# Patient Record
Sex: MALE | Race: BLACK OR AFRICAN AMERICAN
[De-identification: names, ages, dates, MRNs, and addresses within clinical notes are randomized per-mention and may not be internally consistent; named-entity substitution may affect disease eponyms.]

---

## 2019-04-15 ENCOUNTER — HOSPITAL ENCOUNTER (EMERGENCY)
Dept: HOSPITAL 84 - D.ER | Age: 69
Discharge: HOME | End: 2019-04-15
Payer: MEDICARE

## 2019-04-15 VITALS
WEIGHT: 160.34 LBS | HEIGHT: 71 IN | BODY MASS INDEX: 22.45 KG/M2 | WEIGHT: 160.34 LBS | HEIGHT: 71 IN | BODY MASS INDEX: 22.45 KG/M2

## 2019-04-15 VITALS — SYSTOLIC BLOOD PRESSURE: 132 MMHG | DIASTOLIC BLOOD PRESSURE: 86 MMHG

## 2019-04-15 DIAGNOSIS — E86.0: ICD-10-CM

## 2019-04-15 DIAGNOSIS — N28.9: Primary | ICD-10-CM

## 2019-04-15 LAB
ALBUMIN SERPL-MCNC: 3.6 G/DL (ref 3.4–5)
ALP SERPL-CCNC: 55 U/L (ref 46–116)
ALT SERPL-CCNC: 17 U/L (ref 10–68)
AMYLASE SERPL-CCNC: 63 U/L (ref 25–115)
ANION GAP SERPL CALC-SCNC: 14 MMOL/L (ref 8–16)
APTT BLD: 28.3 SECONDS (ref 22.8–39.4)
BASOPHILS NFR BLD AUTO: 0.2 % (ref 0–2)
BILIRUB SERPL-MCNC: 0.32 MG/DL (ref 0.2–1.3)
BUN SERPL-MCNC: 37 MG/DL (ref 7–18)
CALCIUM SERPL-MCNC: 9.2 MG/DL (ref 8.5–10.1)
CHLORIDE SERPL-SCNC: 104 MMOL/L (ref 98–107)
CK MB SERPL-MCNC: 2.5 U/L (ref 0–3.6)
CK SERPL-CCNC: 181 UL (ref 21–232)
CO2 SERPL-SCNC: 26.6 MMOL/L (ref 21–32)
CREAT SERPL-MCNC: 1.7 MG/DL (ref 0.6–1.3)
EOSINOPHIL NFR BLD: 0.4 % (ref 0–7)
ERYTHROCYTE [DISTWIDTH] IN BLOOD BY AUTOMATED COUNT: 12.6 % (ref 11.5–14.5)
GLOBULIN SER-MCNC: 4.4 G/L
GLUCOSE SERPL-MCNC: 109 MG/DL (ref 74–106)
HCT VFR BLD CALC: 39.1 % (ref 42–54)
HGB BLD-MCNC: 13.7 G/DL (ref 13.5–17.5)
IMM GRANULOCYTES NFR BLD: 0 % (ref 0–5)
INR PPP: 1.09 (ref 0.85–1.17)
LIPASE SERPL-CCNC: 186 U/L (ref 73–393)
LYMPHOCYTES NFR BLD AUTO: 14.9 % (ref 15–50)
MCH RBC QN AUTO: 33.9 PG (ref 26–34)
MCHC RBC AUTO-ENTMCNC: 35 G/DL (ref 31–37)
MCV RBC: 96.8 FL (ref 80–100)
MONOCYTES NFR BLD: 7.8 % (ref 2–11)
NEUTROPHILS NFR BLD AUTO: 76.7 % (ref 40–80)
OSMOLALITY SERPL CALC.SUM OF ELEC: 290 MOSM/KG (ref 275–300)
PLATELET # BLD: 175 10X3/UL (ref 130–400)
PMV BLD AUTO: 10.7 FL (ref 7.4–10.4)
POTASSIUM SERPL-SCNC: 3.6 MMOL/L (ref 3.5–5.1)
PROT SERPL-MCNC: 8 G/DL (ref 6.4–8.2)
PROTHROMBIN TIME: 13.6 SECONDS (ref 11.6–15)
RBC # BLD AUTO: 4.04 10X6/UL (ref 4.2–6.1)
SODIUM SERPL-SCNC: 141 MMOL/L (ref 136–145)
TROPONIN I SERPL-MCNC: < 0.017 NG/ML (ref 0–0.06)
WBC # BLD AUTO: 4.6 10X3/UL (ref 4.8–10.8)

## 2021-07-02 ENCOUNTER — HOSPITAL ENCOUNTER (INPATIENT)
Dept: HOSPITAL 84 - D.ER | Age: 71
LOS: 1 days | Discharge: HOME | DRG: 292 | End: 2021-07-03
Attending: FAMILY MEDICINE | Admitting: FAMILY MEDICINE
Payer: MEDICARE

## 2021-07-02 VITALS — WEIGHT: 201.42 LBS | BODY MASS INDEX: 28.2 KG/M2 | HEIGHT: 71 IN

## 2021-07-02 VITALS — SYSTOLIC BLOOD PRESSURE: 123 MMHG | DIASTOLIC BLOOD PRESSURE: 57 MMHG

## 2021-07-02 VITALS — DIASTOLIC BLOOD PRESSURE: 78 MMHG | SYSTOLIC BLOOD PRESSURE: 127 MMHG

## 2021-07-02 VITALS — DIASTOLIC BLOOD PRESSURE: 77 MMHG | SYSTOLIC BLOOD PRESSURE: 119 MMHG

## 2021-07-02 VITALS — DIASTOLIC BLOOD PRESSURE: 61 MMHG | SYSTOLIC BLOOD PRESSURE: 111 MMHG

## 2021-07-02 DIAGNOSIS — J44.1: ICD-10-CM

## 2021-07-02 DIAGNOSIS — E87.5: ICD-10-CM

## 2021-07-02 DIAGNOSIS — D64.9: ICD-10-CM

## 2021-07-02 DIAGNOSIS — I25.10: ICD-10-CM

## 2021-07-02 DIAGNOSIS — F17.200: ICD-10-CM

## 2021-07-02 DIAGNOSIS — E87.2: ICD-10-CM

## 2021-07-02 DIAGNOSIS — I13.0: Primary | ICD-10-CM

## 2021-07-02 DIAGNOSIS — N17.9: ICD-10-CM

## 2021-07-02 DIAGNOSIS — R07.9: ICD-10-CM

## 2021-07-02 DIAGNOSIS — N18.30: ICD-10-CM

## 2021-07-02 DIAGNOSIS — I50.9: ICD-10-CM

## 2021-07-02 LAB
ALBUMIN SERPL-MCNC: 3.9 G/DL (ref 3.4–5)
ALP SERPL-CCNC: 60 U/L (ref 30–120)
ALT SERPL-CCNC: 25 U/L (ref 10–68)
AMPHETAMINES UR QL SCN: NEGATIVE QUAL
ANION GAP SERPL CALC-SCNC: 13.7 MMOL/L (ref 8–16)
ANION GAP SERPL CALC-SCNC: 17.7 MMOL/L (ref 8–16)
ANION GAP SERPL CALC-SCNC: 19.7 MMOL/L (ref 8–16)
ANION GAP SERPL CALC-SCNC: 23.3 MMOL/L (ref 8–16)
BARBITURATES UR QL SCN: NEGATIVE QUAL
BASOPHILS NFR BLD AUTO: 1.1 % (ref 0–2)
BENZODIAZ UR QL SCN: NEGATIVE QUAL
BILIRUB SERPL-MCNC: 0.29 MG/DL (ref 0.2–1.3)
BILIRUB SERPL-MCNC: NEGATIVE MG/DL
BUN SERPL-MCNC: 63 MG/DL (ref 7–18)
BUN SERPL-MCNC: 69 MG/DL (ref 7–18)
BUN SERPL-MCNC: 81 MG/DL (ref 7–18)
BUN SERPL-MCNC: 84 MG/DL (ref 7–18)
BZE UR QL SCN: NEGATIVE QUAL
CALCIUM SERPL-MCNC: 7.8 MG/DL (ref 8.5–10.1)
CALCIUM SERPL-MCNC: 8.1 MG/DL (ref 8.5–10.1)
CALCIUM SERPL-MCNC: 8.4 MG/DL (ref 8.5–10.1)
CALCIUM SERPL-MCNC: 8.6 MG/DL (ref 8.5–10.1)
CANNABINOIDS UR QL SCN: NEGATIVE QUAL
CHLORIDE SERPL-SCNC: 107 MMOL/L (ref 98–107)
CHLORIDE SERPL-SCNC: 107 MMOL/L (ref 98–107)
CHLORIDE SERPL-SCNC: 109 MMOL/L (ref 98–107)
CHLORIDE SERPL-SCNC: 111 MMOL/L (ref 98–107)
CK MB SERPL-MCNC: 11.1 U/L (ref 0–3.6)
CK MB SERPL-MCNC: 13.4 U/L (ref 0–3.6)
CK MB SERPL-MCNC: 6.4 U/L (ref 0–3.6)
CK SERPL-CCNC: 278 UL (ref 21–232)
CK SERPL-CCNC: 290 UL (ref 21–232)
CK SERPL-CCNC: 327 UL (ref 21–232)
CO2 SERPL-SCNC: 11.4 MMOL/L (ref 21–32)
CO2 SERPL-SCNC: 12 MMOL/L (ref 21–32)
CO2 SERPL-SCNC: 18.2 MMOL/L (ref 21–32)
CO2 SERPL-SCNC: 20.1 MMOL/L (ref 21–32)
CREAT SERPL-MCNC: 2.1 MG/DL (ref 0.6–1.3)
CREAT SERPL-MCNC: 2.3 MG/DL (ref 0.6–1.3)
CREAT SERPL-MCNC: 2.9 MG/DL (ref 0.6–1.3)
CREAT SERPL-MCNC: 3.2 MG/DL (ref 0.6–1.3)
EOSINOPHIL NFR BLD: 1 % (ref 0–7)
ERYTHROCYTE [DISTWIDTH] IN BLOOD BY AUTOMATED COUNT: 13.8 % (ref 11.5–14.5)
ETHANOL SERPL-MCNC: 0 MG/DL (ref 0–10)
GLOBULIN SER-MCNC: 4.1 G/L
GLUCOSE SERPL-MCNC: 199 MG/DL (ref 74–106)
GLUCOSE SERPL-MCNC: 65 MG/DL (ref 74–106)
GLUCOSE SERPL-MCNC: 78 MG/DL (ref 74–106)
GLUCOSE SERPL-MCNC: 88 MG/DL (ref 74–106)
HCT VFR BLD CALC: 35.1 % (ref 42–54)
HGB BLD-MCNC: 11.8 G/DL (ref 13.5–17.5)
KETONES UR STRIP-MCNC: NEGATIVE MG/DL
LYMPHOCYTES NFR BLD AUTO: 18.1 % (ref 15–50)
MCH RBC QN AUTO: 33.3 PG (ref 26–34)
MCHC RBC AUTO-ENTMCNC: 33.5 G/DL (ref 31–37)
MCV RBC: 99.4 FL (ref 80–100)
MONOCYTES NFR BLD: 11.4 % (ref 2–11)
NEUTROPHILS # BLD: 3.5 10X3/UL (ref 1.78–5.38)
NEUTROPHILS NFR BLD AUTO: 68.4 % (ref 40–80)
NITRITE UR-MCNC: NEGATIVE MG/ML
NT-PROBNP SERPL-MCNC: 108 PG/ML (ref 0–125)
OPIATES UR QL SCN: NEGATIVE QUAL
OSMOLALITY SERPL CALC.SUM OF ELEC: 288 MOSM/KG (ref 275–300)
OSMOLALITY SERPL CALC.SUM OF ELEC: 295 MOSM/KG (ref 275–300)
OSMOLALITY SERPL CALC.SUM OF ELEC: 296 MOSM/KG (ref 275–300)
OSMOLALITY SERPL CALC.SUM OF ELEC: 300 MOSM/KG (ref 275–300)
PCP UR QL SCN: NEGATIVE QUAL
PH UR STRIP: 5 [PH] (ref 5–8)
PLATELET # BLD: 207 10X3/UL (ref 130–400)
PMV BLD AUTO: 8.2 FL (ref 7.4–10.4)
POTASSIUM SERPL-SCNC: 4.8 MMOL/L (ref 3.5–5.1)
POTASSIUM SERPL-SCNC: 5.1 MMOL/L (ref 3.5–5.1)
POTASSIUM SERPL-SCNC: 6.3 MMOL/L (ref 3.5–5.1)
POTASSIUM SERPL-SCNC: 6.9 MMOL/L (ref 3.5–5.1)
PROT SERPL-MCNC: 8 G/DL (ref 6.4–8.2)
RBC # BLD AUTO: 3.53 10X6/UL (ref 4.2–6.1)
SARS-COV+SARS-COV-2 AG RESP QL IA.RAPID: (no result)
SODIUM SERPL-SCNC: 135 MMOL/L (ref 136–145)
SODIUM SERPL-SCNC: 136 MMOL/L (ref 136–145)
SODIUM SERPL-SCNC: 136 MMOL/L (ref 136–145)
SODIUM SERPL-SCNC: 140 MMOL/L (ref 136–145)
SQUAMOUS #/AREA URNS HPF: <1 HPF (ref 0–4)
TROPONIN I SERPL-MCNC: 0.02 NG/ML (ref 0–0.06)
TROPONIN I SERPL-MCNC: 0.13 NG/ML (ref 0–0.06)
TROPONIN I SERPL-MCNC: 1.14 NG/ML (ref 0–0.06)
UROBILINOGEN UR-MCNC: NORMAL MG/DL (ref ?–2)
WBC # BLD AUTO: 5 10X3/UL (ref 4.8–10.8)
WBC #/AREA URNS HPF: 2 HPF (ref 0–1)

## 2021-07-02 NOTE — NUR
CRITICAL LAB VALUE CALLED TO ED. POTASSIUM OF 6.9. EDP KIRBY NOTIFIED. FURTHER
ORDERS TO BE ENTERED INTO CHART.

## 2021-07-02 NOTE — NUR
RECEIVED TO ROOM VIA STRETCHER FROM THE ED. IV INFUSING TO LEFT AC AND SALINE
LOCK TO RIGHT AC. YELLOWW GOWN PLACED ON PATIENT ALONG WITH YELLOW ARM BAND
AND NON SKID SOCKS. DENEIS NEEDS AT THIS TIME. WILL ADMIT.

## 2021-07-03 VITALS — DIASTOLIC BLOOD PRESSURE: 74 MMHG | SYSTOLIC BLOOD PRESSURE: 104 MMHG

## 2021-07-03 VITALS — DIASTOLIC BLOOD PRESSURE: 66 MMHG | SYSTOLIC BLOOD PRESSURE: 108 MMHG

## 2021-07-03 VITALS — SYSTOLIC BLOOD PRESSURE: 110 MMHG | DIASTOLIC BLOOD PRESSURE: 76 MMHG

## 2021-07-03 LAB
ALBUMIN SERPL-MCNC: 3 G/DL (ref 3.4–5)
ALP SERPL-CCNC: 52 U/L (ref 30–120)
ALT SERPL-CCNC: 20 U/L (ref 10–68)
ANION GAP SERPL CALC-SCNC: 15.3 MMOL/L (ref 8–16)
BASOPHILS NFR BLD AUTO: 0.4 % (ref 0–2)
BILIRUB SERPL-MCNC: 0.45 MG/DL (ref 0.2–1.3)
BUN SERPL-MCNC: 54 MG/DL (ref 7–18)
CALCIUM SERPL-MCNC: 8.1 MG/DL (ref 8.5–10.1)
CHLORIDE SERPL-SCNC: 109 MMOL/L (ref 98–107)
CO2 SERPL-SCNC: 20.2 MMOL/L (ref 21–32)
CREAT SERPL-MCNC: 1.8 MG/DL (ref 0.6–1.3)
EOSINOPHIL NFR BLD: 0.2 % (ref 0–7)
ERYTHROCYTE [DISTWIDTH] IN BLOOD BY AUTOMATED COUNT: 13.2 % (ref 11.5–14.5)
GLOBULIN SER-MCNC: 3.2 G/L
GLUCOSE SERPL-MCNC: 85 MG/DL (ref 74–106)
HCT VFR BLD CALC: 32.8 % (ref 42–54)
HGB BLD-MCNC: 10.9 G/DL (ref 13.5–17.5)
LYMPHOCYTES NFR BLD AUTO: 15.8 % (ref 15–50)
MAGNESIUM SERPL-MCNC: 2.3 MG/DL (ref 1.8–2.4)
MCH RBC QN AUTO: 33 PG (ref 26–34)
MCHC RBC AUTO-ENTMCNC: 33.1 G/DL (ref 31–37)
MCV RBC: 99.7 FL (ref 80–100)
MONOCYTES NFR BLD: 14.3 % (ref 2–11)
NEUTROPHILS # BLD: 3.9 10X3/UL (ref 1.78–5.38)
NEUTROPHILS NFR BLD AUTO: 69.3 % (ref 40–80)
OSMOLALITY SERPL CALC.SUM OF ELEC: 292 MOSM/KG (ref 275–300)
PHOSPHATE SERPL-MCNC: 3.9 MG/DL (ref 2.5–4.9)
PLATELET # BLD: 179 10X3/UL (ref 130–400)
PMV BLD AUTO: 9 FL (ref 7.4–10.4)
POTASSIUM SERPL-SCNC: 4.5 MMOL/L (ref 3.5–5.1)
PROT SERPL-MCNC: 6.2 G/DL (ref 6.4–8.2)
RBC # BLD AUTO: 3.28 10X6/UL (ref 4.2–6.1)
SODIUM SERPL-SCNC: 140 MMOL/L (ref 136–145)
WBC # BLD AUTO: 5.6 10X3/UL (ref 4.8–10.8)

## 2021-07-03 NOTE — NUR
PROVIDED VERBAL AND WRITTEN DISCHARGE TEACHING. PT STATED THAT HIS RIDE WILL
NOT BE HERE UNTIL AFTER 1630. ALL NEEDS MET, CALL LIGHT IN REACH.

## 2021-07-03 NOTE — MORECARE
CASE MANAGEMENT DISCHARGE SUMMARY
 
 
PATIENT: TONIA ROLLINS                        UNIT: [id number]
ACCOUNT#: [id number]                       ADM DATE: 21
AGE: 70     : [birth year]  SEX: M            ROOM/BED: D.2113    
AUTHOR: OLLIE GUERIN                             PHYSICIAN:                               
 
REFERRING PHYSICIAN: BARBRA CLIFTON MD          
DATE OF SERVICE: 21
Case Management Discharge Planning Summary
 
 
COMMENTS 
ENTERED DATE:  7/3/21  15:27 CT
COMMENT TYPE:  Discharge Planning
REVIEWER: Cruz Nguyen
CM met with patient to complete DC plan and to evaluate needs.  Patient is 
dependent for ADLs and lives alone.  Patient stated that he has a caregiver, 
Marcela Henry, who will give him a ride home.  Patient stated that he will not 
be able to go home today until after 4pm.  Patient stated that his home is 
safe and has electricity and running water.  Patient stated that the home 
has 1 step to enter and he is able to manage the step without difficulty.  
Patient stated that he has no problems paying for medications and he fills 
his medications at [facility].  Patient stated that his primary 
care physician is Dr. Gomez.  At discharge, the patient plans to return home 
and feels this is a safe discharge.  CM discussed availability of home 
health, rehab services, and medical equipment.  Patient declined HHS, SNF, 
IPR, and DME.  Patient stated that he would like information on in-home 
caregivers.  CM gave patient a pamphlet on [employer].  Patient 
stated that he has a cane and walker.  Patient voiced no other needs at this 
time and is satisfied with DC plan.  Transportation provider at discharge 
will be with Marcela.  DC IMM delivered, explained, signed by the patient, and 
placed in chart.  Signed form also left with the patient.  CM will continue 
to follow and will assist as needed with dc plans/needs.
 
 
DCP REVIEW SUMMARY
ANTICIPATED D/C DATE: 2021
EXPECTED LOS : 1
CASE STATUS:  DCP Initiated
INITIAL REVIEW:  2021
INITIAL REVIEWER:   Cruz Nguyen
FINAL DISCHARGE DISPOSITION:  : 
FINAL REVIEWER:  
FINAL REVIEW DATE: 
 
  
 
DCP Focus Questions & Answers
 
 
DCP Evaluation
QUESTION: ANSWER
Patient and/or caregiver agree upon recommended discharge plan? : Yes
Patient's current cognitive status: : *Oriented to person, place, situation, 
time and present
Patient's ability to cope with chronic illness : d. No chronic illness
Patient gives permission to discuss discharge plans with:  (name, 
relationship and number) : caregiver, Marcela Henry
Family / Caregiver's ability to cope with chronic illness: : a. Adequate 
(ability to meet patient's medical needs, ensures patient attends medical 
appts.)
Does the patient have the ability to pay for or attain post discharge needs 
/ services? : Yes
Functional screen assessment: : Basic needs can adequately be met by self
Family / Caregiver's ability to cope with chronic illness: : a. Adequate 
(ability to meet patient's medical needs, ensures patient attends medical 
appts.)
Physical Status: : Partial care dependence
Physical Status: : Mobility impaired
Equipment needed for post hospitalization: : None
Is there a likelihood that the patient will require additional services to 
return to the preadmission environment? : No
Living Arrangements: : Home Alone with Support
Partial Dependence, assistance required for: : Dressing
Partial Dependence, assistance required for: : Bathing
Partial Dependence, assistance required for: : Ambulation / Mobility
Patient with capacity for self-care or can be cared for in same environment 
as prior to hospitalization? : Yes
Baseline cognitive status: : *Oriented to person, place, situation, time and 
present
Physical environment modification needed / anticipated for discharge: : No
Medication Management: : Patient states can read and understand medication 
labels
Medication Management: : Patient states can afford medications
Pharmacy name(s): : [facility]
Does Patient have transportation to get home and to follow-up medical 
appointments when discharged from the hospital? : Yes
Would patient like to participate in any Care Coordination programs (if 
applicable): : Not applicable
Does the patient have electricity at home? : Yes
Does the patient have running water in their house? : Yes
Equipment in use: : Walker - Rolling
Equipment in use: : Cane - Single Leg
Mental health screen: : No mental health history
 
 
 
DCP Re-evaluation
QUESTION: ANSWER
Would patient like to participate in any Care Coordination programs (if 
applicable): : Not applicable
 
 
 
 
 
 
 
 
PATIENT:  TONIA ROLLINS
ENCOUNTER:  [id number]
MEDICAL RECORD#:  [id number]
ADMISSION DATE:  2021
DISCHARGE DATE:  2021
ATTENDING MD:  ETIENNE CLIFTON
:   1950-Aug-29
AGE:  70
MARITAL STATUS:   S
DC PLAN ID:  [id number]
FACILITY:   [facility]
PRINTED ON: 7/3/21  18:11  CT
 
 
 
 
 
 
 
 
**All edits/amendments must be made on the electronic document**
 
DICTATION DATE: 21     : KALEB  21     
RPT#: [id number]                                DC DATE:21
                                               STATUS: DIS IN  
[facility]
 [place] [place] [address]
***END OF REPORT***

## 2021-07-03 NOTE — MORECARE
CASE MANAGEMENT DISCHARGE SUMMARY
 
 
PATIENT: TONIA ROLLINS                        UNIT: [id number]
ACCOUNT#: [id number]                       ADM DATE: 21
AGE: 70     : 46  SEX: M            ROOM/BED: D.2113    
AUTHOR: OLLIE GUERIN                             PHYSICIAN:                               
 
REFERRING PHYSICIAN: BARBRA CLIFTON MD          
DATE OF SERVICE: 21
Case Management Discharge Planning Summary
 
 
COMMENTS 
ENTERED DATE:  7/3/21  15:27 CT
COMMENT TYPE:  Discharge Planning
REVIEWER: Cruz Nguyen
CM met with patient to complete DC plan and to evaluate needs.  Patient is 
dependent for ADLs and lives alone.  Patient stated that he has a caregiver, 
Marcela Henry, who will give him a ride home.  Patient stated that he will not 
be able to go home today until after 4pm.  Patient stated that his home is 
safe and has electricity and running water.  Patient stated that the home 
has 1 step to enter and he is able to manage the step without difficulty.  
Patient stated that he has no problems paying for medications and he fills 
his medications at [facility].  Patient stated that his primary 
care physician is Dr. Gomez.  At discharge, the patient plans to return home 
and feels this is a safe discharge.  CM discussed availability of home 
health, rehab services, and medical equipment.  Patient declined HHS, SNF, 
IPR, and DME.  Patient stated that he would like information on in-home 
caregivers.  CM gave patient a pamphlet on [employer].  Patient 
stated that he has a cane and walker.  Patient voiced no other needs at this 
time and is satisfied with DC plan.  Transportation provider at discharge 
will be with Marcela.  DC IMM delivered, explained, signed by the patient, and 
placed in chart.  Signed form also left with the patient.  CM will continue 
to follow and will assist as needed with dc plans/needs.
 
 
DCP REVIEW SUMMARY
ANTICIPATED D/C DATE: 2021
EXPECTED LOS : 1
CASE STATUS:  DCP Initiated
INITIAL REVIEW:  2021
INITIAL REVIEWER:   Cruz Nguyen
FINAL DISCHARGE DISPOSITION:  : 
FINAL REVIEWER:  
FINAL REVIEW DATE: 
 
  
 
DCP Focus Questions & Answers
 
 
DCP Evaluation
QUESTION: ANSWER
Patient and/or caregiver agree upon recommended discharge plan? : Yes
Patient's current cognitive status: : *Oriented to person, place, situation, 
time and present
Patient's ability to cope with chronic illness : d. No chronic illness
Patient gives permission to discuss discharge plans with:  (name, 
relationship and number) : caregiver, Marcela Henry
Family / Caregiver's ability to cope with chronic illness: : a. Adequate 
(ability to meet patient's medical needs, ensures patient attends medical 
appts.)
Does the patient have the ability to pay for or attain post discharge needs 
/ services? : Yes
Functional screen assessment: : Basic needs can adequately be met by self
Family / Caregiver's ability to cope with chronic illness: : a. Adequate 
(ability to meet patient's medical needs, ensures patient attends medical 
appts.)
Physical Status: : Partial care dependence
Physical Status: : Mobility impaired
Equipment needed for post hospitalization: : None
Is there a likelihood that the patient will require additional services to 
return to the preadmission environment? : No
Living Arrangements: : Home Alone with Support
Partial Dependence, assistance required for: : Dressing
Partial Dependence, assistance required for: : Bathing
Partial Dependence, assistance required for: : Ambulation / Mobility
Patient with capacity for self-care or can be cared for in same environment 
as prior to hospitalization? : Yes
Baseline cognitive status: : *Oriented to person, place, situation, time and 
present
Physical environment modification needed / anticipated for discharge: : No
Medication Management: : Patient states can read and understand medication 
labels
Medication Management: : Patient states can afford medications
Pharmacy name(s): : [facility]
Does Patient have transportation to get home and to follow-up medical 
appointments when discharged from the hospital? : Yes
Would patient like to participate in any Care Coordination programs (if 
applicable): : Not applicable
Does the patient have electricity at home? : Yes
Does the patient have running water in their house? : Yes
Equipment in use: : Walker - Rolling
Equipment in use: : Cane - Single Leg
Mental health screen: : No mental health history
 
 
 
DCP Re-evaluation
QUESTION: ANSWER
Would patient like to participate in any Care Coordination programs (if 
applicable): : Not applicable
 
 
 
 
 
 
 
 
PATIENT:  TONIA ROLLINS
ENCOUNTER:  [id number]
MEDICAL RECORD#:  [id number]
ADMISSION DATE:  2021
DISCHARGE DATE:  
ATTENDING MD:  ETIENNE CLIFTON
:   1950-Aug-29
AGE:  70
MARITAL STATUS:   S
DC PLAN ID:  [id number]
FACILITY:   [facility]
PRINTED ON: 7/3/21  15:30  CT
 
 
 
 
 
 
 
 
**All edits/amendments must be made on the electronic document**
 
DICTATION DATE: 21     : KALEB  21     
RPT#: [id number]                                DC DATE:        
                                               STATUS: ADM IN  
[facility]
 [place] [address]
***END OF REPORT***

## 2021-07-03 NOTE — EC
PATIENT:TONIA ROLLINS                    DATE OF SERVICE: 07/02/21
SEX: M                                  MEDICAL RECORD: [id number]
DATE OF BIRTH: [birth year]                        LOCATION:D.M2      D.211
AGE OF PATIENT: 70                             ADMISSION DATE: 07/02/21
 
REFERRING PHYSICIAN:                               
 
INTERPRETING PHYSICIAN: VIRAL PANTOJA MD               
 
 
 
                             ECHOCARDIOGRAM REPORT
  ECHO CHARGES 4               ECHO COMPLETE                 Date: 07/02/21
 
 
 
CLINICAL DIAGNOSIS: HX CAD                        
 
                         ECHOCARDIOGRAPHIC MEASUREMENTS
      (adult normal given)
   AC root (d.<3.7cm) 4.0  cm   LV Septum d (<1.2 cm> 1.0  cm
      Valve Excursion 2.2  cm     LV Septum (systole) 1.7  cm
Left Atria (s.<4.0cm> 3.7  cm          LVPW d(<1.2cm) 1.0  cm
        RV (d.<2.3cm) 3.9  cm           LVPW (sytole) 1.2  cm
  LV diastole(<5.6CM) 5.2  cm       MV E-F(>70mm/sec)      cm
           LV systole 2.8  cm           LVOT Diameter 1.9  cm
       MV exc.(>10mm)      cm
Est.ejection fraction (50-75%) 55  %
 
   DOPPLER:
     LVIT      cm/sec A 106  cm/sec E 59    cm/sec
       LA      cm/sec      RVSP 22   mmHg
     LVOT 125  cm/sec   AOP1/2T      m/s
  Asc. Ao 184  cm/sec
     RVOT 87   cm/sec
       RA 4.3  cm/sec
         cm/sec
 AV Gradient Peak 13   mmHg  AV Mean 5    mmHg  AV Area 2.0  cm
 MV Gradient Peak 5    mmHg  MV Mean 1    mmHg  MV Area      cm
   COMMENTS:                                              
 
 
 Cardiac Sonographer: Zechariah ALLEN            
      Cardiologist: Loyd Pantoja               
             TAPE#                
                                       Pericardial Effusion                          
 
 
DATE OF SERVICE:  
 
CLINICAL HISTORY:  History of coronary artery disease.
 
INTERPRETATION:  Technically difficult study secondary to body habitus. 
Overall, normal left ventricular chamber size and contractile function, ejection
fraction of 55%.  Left atrial chamber appears normal.  Right atrium and right
ventricular chamber size and function appears normal.  Aortic valve is not well
visualized, but appears normal.  No aortic regurgitation/stenosis noted.  Mitral
valve appears normal.  No mitral regurgitation.  Tricuspid valve not well
 
 
 
ECHOCARDIOGRAM REPORT                          [id number]    TONIA ROLLINS            
 
 
visualized, but appears normal.  Trace tricuspid regurgitation.  Pulmonic valve
not well visualized.  No pulmonary regurgitation noted.  No pericardial effusion
visualized.
 
IMPRESSION:  Technically difficult study secondary to body habitus.  Overall,
normal left ventricular chamber size and contractile function with ejection
fraction of 55%.
 
TRANSINT:[id number] Voice Confirmation ID: [id number] DOCUMENT ID: [id number]
                                           
                                           VIRAL PANTOJA MD               
 
 
 
Electronically Signed by VIRAL PANTOJA on 07/03/21 at 1201
 
 
 
 
 
 
 
 
 
 
 
 
 
 
 
 
 
 
 
 
 
 
 
 
 
 
 
 
 
 
 
CC:                                                             [id number]
DICTATION DATE: 07/02/21 1450     :     07/02/21 1619      ADM IN  
                                                                              
[facility]                                          
[address] [id number]

## 2021-07-03 NOTE — NUR
AM MEDS GIVEN AS ORDERED. PT A/O X4, RESP EVEN AND NONLABORED ON RA. LT AC
INFUISNG SODIUM BICARB . RT AC SL. SR-73 ON TELE. PT DENIES ANY NEEDS AT
THIS TIME. CALL LIGHT IN REACH, WILL CONTINUE PLAN OF CARE.

## 2021-07-04 NOTE — MORECARE
CASE MANAGEMENT DISCHARGE SUMMARY
 
 
PATIENT: TONIA ROLLINS                        UNIT: [id number]
ACCOUNT#: [id number]                       ADM DATE: 21
AGE: 70     : 46  SEX: M            ROOM/BED: D.2113    
AUTHOR: OLLIE GUERIN                             PHYSICIAN:                               
 
REFERRING PHYSICIAN: BARBRA CLIFTON MD          
DATE OF SERVICE: 21
Case Management Discharge Planning Summary
 
 
COMMENTS 
ENTERED DATE:  7/3/21  15:27 CT
COMMENT TYPE:  Discharge Planning
REVIEWER: Cruz Nguyen
CM met with patient to complete DC plan and to evaluate needs.  Patient is 
dependent for ADLs and lives alone.  Patient stated that he has a caregiver, 
Marcela Henry, who will give him a ride home.  Patient stated that he will not 
be able to go home today until after 4pm.  Patient stated that his home is 
safe and has electricity and running water.  Patient stated that the home 
has 1 step to enter and he is able to manage the step without difficulty.  
Patient stated that he has no problems paying for medications and he fills 
his medications at [facility].  Patient stated that his primary 
care physician is Dr. Gomez.  At discharge, the patient plans to return home 
and feels this is a safe discharge.  CM discussed availability of home 
health, rehab services, and medical equipment.  Patient declined HHS, SNF, 
IPR, and DME.  Patient stated that he would like information on in-home 
caregivers.  CM gave patient a pamphlet on [employer].  Patient 
stated that he has a cane and walker.  Patient voiced no other needs at this 
time and is satisfied with DC plan.  Transportation provider at discharge 
will be with Marcela.  DC IMM delivered, explained, signed by the patient, and 
placed in chart.  Signed form also left with the patient.  CM will continue 
to follow and will assist as needed with dc plans/needs.
 
 
DCP REVIEW SUMMARY
ANTICIPATED D/C DATE: 2021
EXPECTED LOS : 1
CASE STATUS:  DCP Complete
INITIAL REVIEW:  2021
INITIAL REVIEWER:   Cruz Nguyen
FINAL DISCHARGE DISPOSITION:  : 
FINAL REVIEWER:  
FINAL REVIEW DATE: 
 
  
 
DCP Focus Questions & Answers
 
 
DCP Evaluation
QUESTION: ANSWER
Patient gives permission to discuss discharge plans with:  (name, 
relationship and number) : caregiver, Marcela Pereack
Patient's ability to cope with chronic illness : d. No chronic illness
Patient's current cognitive status: : *Oriented to person, place, situation, 
time and present
Family / Caregiver's ability to cope with chronic illness: : a. Adequate 
(ability to meet patient's medical needs, ensures patient attends medical 
appts.)
Patient and/or caregiver agree upon recommended discharge plan? : Yes
Physical Status: : Mobility impaired
Physical Status: : Partial care dependence
Family / Caregiver's ability to cope with chronic illness: : a. Adequate 
(ability to meet patient's medical needs, ensures patient attends medical 
appts.)
Functional screen assessment: : Basic needs can adequately be met by self
Does the patient have the ability to pay for or attain post discharge needs 
/ services? : Yes
Partial Dependence, assistance required for: : Ambulation / Mobility
Partial Dependence, assistance required for: : Bathing
Partial Dependence, assistance required for: : Dressing
Living Arrangements: : Home Alone with Support
Is there a likelihood that the patient will require additional services to 
return to the preadmission environment? : No
Equipment needed for post hospitalization: : None
Baseline cognitive status: : *Oriented to person, place, situation, time and 
present
Patient with capacity for self-care or can be cared for in same environment 
as prior to hospitalization? : Yes
Physical environment modification needed / anticipated for discharge: : No
Medication Management: : Patient states can afford medications
Medication Management: : Patient states can read and understand medication 
labels
Pharmacy name(s): : [facility]
Does Patient have transportation to get home and to follow-up medical 
appointments when discharged from the hospital? : Yes
Would patient like to participate in any Care Coordination programs (if 
applicable): : Not applicable
Does the patient have electricity at home? : Yes
Does the patient have running water in their house? : Yes
Equipment in use: : Cane - Single Leg
Equipment in use: : Walker - Rolling
Mental health screen: : No mental health history
 
 
 
DCP Re-evaluation
QUESTION: ANSWER
Would patient like to participate in any Care Coordination programs (if 
applicable): : Not applicable
 
 
 
 
 
 
 
 
PATIENT:  TONIA ROLLINS
ENCOUNTER:  [id number]
MEDICAL RECORD#:  [id number]
ADMISSION DATE:  2021
DISCHARGE DATE:  2021
ATTENDING MD:  ETIENNE CLIFTON
:   1950-Aug-29
AGE:  70
MARITAL STATUS:   S
DC PLAN ID:  [id number]
FACILITY:   [facility]
PRINTED ON: 21  17:25  CT
 
 
 
 
 
 
 
 
**All edits/amendments must be made on the electronic document**
 
DICTATION DATE: 21     : KALEB  21     
RPT#: [id number]                                DC DATE:21
                                               STATUS: DIS IN  
[facility]
 [place] [address]
***END OF REPORT***